# Patient Record
Sex: MALE | Race: WHITE | NOT HISPANIC OR LATINO | URBAN - METROPOLITAN AREA
[De-identification: names, ages, dates, MRNs, and addresses within clinical notes are randomized per-mention and may not be internally consistent; named-entity substitution may affect disease eponyms.]

---

## 2019-10-06 ENCOUNTER — EMERGENCY (EMERGENCY)
Facility: HOSPITAL | Age: 64
LOS: 0 days | Discharge: HOME | End: 2019-10-06
Attending: EMERGENCY MEDICINE | Admitting: EMERGENCY MEDICINE
Payer: MEDICARE

## 2019-10-06 VITALS
TEMPERATURE: 97 F | RESPIRATION RATE: 17 BRPM | SYSTOLIC BLOOD PRESSURE: 174 MMHG | OXYGEN SATURATION: 98 % | DIASTOLIC BLOOD PRESSURE: 79 MMHG | HEART RATE: 85 BPM

## 2019-10-06 DIAGNOSIS — K03.81 CRACKED TOOTH: ICD-10-CM

## 2019-10-06 DIAGNOSIS — K08.89 OTHER SPECIFIED DISORDERS OF TEETH AND SUPPORTING STRUCTURES: ICD-10-CM

## 2019-10-06 DIAGNOSIS — K02.9 DENTAL CARIES, UNSPECIFIED: ICD-10-CM

## 2019-10-06 PROCEDURE — 99283 EMERGENCY DEPT VISIT LOW MDM: CPT

## 2019-10-06 RX ORDER — AMOXICILLIN 250 MG/5ML
1 SUSPENSION, RECONSTITUTED, ORAL (ML) ORAL
Qty: 30 | Refills: 0
Start: 2019-10-06 | End: 2019-10-15

## 2019-10-06 RX ORDER — AMOXICILLIN 250 MG/5ML
500 SUSPENSION, RECONSTITUTED, ORAL (ML) ORAL ONCE
Refills: 0 | Status: COMPLETED | OUTPATIENT
Start: 2019-10-06 | End: 2019-10-06

## 2019-10-06 RX ORDER — IBUPROFEN 200 MG
1 TABLET ORAL
Qty: 20 | Refills: 0
Start: 2019-10-06 | End: 2019-10-10

## 2019-10-06 RX ORDER — IBUPROFEN 200 MG
600 TABLET ORAL ONCE
Refills: 0 | Status: COMPLETED | OUTPATIENT
Start: 2019-10-06 | End: 2019-10-06

## 2019-10-06 RX ADMIN — Medication 600 MILLIGRAM(S): at 16:27

## 2019-10-06 RX ADMIN — Medication 500 MILLIGRAM(S): at 16:27

## 2019-10-06 NOTE — ED PROVIDER NOTE - CLINICAL SUMMARY MEDICAL DECISION MAKING FREE TEXT BOX
Elderly male presented to ED for dental pain. dental block placed, will have pt f/up in dental clinic. Results reviewed and discussed with pt and printed for patient. Anticipatory guidance given including close outpatient followup. Strict return precautions given. Pt verbalizes understanding of and agrees with plan.

## 2019-10-06 NOTE — ED PROVIDER NOTE - PHYSICAL EXAMINATION
PHYSICAL EXAM:    GENERAL: Alert, appears stated age, well appearing, non-toxic  SKIN: Warm, pink and dry. MMM.   EYE: Normal lids/conjunctiva  ENT: Normal hearing, patent oropharynx. +overall poor dentition +tooth #10 cracked with TTP. no drooling, foul odor, trismus, discharge, swelling, warmth.   Pulm: Bilateral BS, normal resp effort, no wheezes, stridor, or retractions  CV: RRR, no M/R/G, 2+and = radial pulses  Neuro: AAOx3, normal gait.

## 2019-10-06 NOTE — CONSULT NOTE ADULT - SUBJECTIVE AND OBJECTIVE BOX
64 year old male presents to ED due to dental pain associated with upper left tooth #10.    As per ED notes:    "HPI Objective Statement: 65 y/o M with PMH MM no chemo x 3 mos, HTN, ankylosing spondylitis, CAD with MI x 2 stents (10 years ago), CHF, seizures on Lamictal presents with L throbbing, non-radiating, moderate upper tooth pain x 2 days. relates tooth has been cracked x 1 month. +benefit with cold beverages, worse with palpation. no fever/n/v. denies foul odor, trismus, discharge, swelling, warmth, decreased PO fluids, malaise, loose teeth.      ALLERGIES AND HOME MEDICATIONS:   Allergies:        Allergies:  	No Known Drug Allergies:   	Originally Entered as [Unknown] reaction to [ASPARTAME]: Miscellaneous, Unknown, Originally Entered as [Unknown] reaction to [ASPARTAME]"    ED has prescribed Amoxicillin and Motrin for symptom management.    EOE: WNL. No extra-oral swelling noted.  IOE: Poor dentition. Grossly decayed tooth #10 noted. No intra-oral swelling noted.    Patient requested injection for temporary pain relief. 1 carpule 0.5% marcaine 1:200,000 epinephrine via infiltration. Patient feels relief.    Recommended follow-up with outpatient dentist for definitive care. Patient states he has medicare and thus no dental insurance and does not have a primary dental provider. Patient states he understands that fees through the dental clinic would be out-of-pocket but may wish to follow-up in the [facility] dental clinic tomorrow.

## 2019-10-06 NOTE — ED PROVIDER NOTE - PATIENT PORTAL LINK FT
You can access the FollowMyHealth Patient Portal offered by [facility] by registering at the following website: [email or web address]. By joining [employer]’s FollowMyHealth portal, you will also be able to view your health information using other applications (apps) compatible with our system.

## 2019-10-06 NOTE — ED PROVIDER NOTE - OBJECTIVE STATEMENT
65 y/o M with PMH MM no chemo x 3 mos, HTN, anklyosing spondylitis, CAD with MI 65 y/o M with PMH MM no chemo x 3 mos, HTN, ankylosing spondylitis, CAD with MI x 2 stents (10 years ago), CHF, seizures on Lamictal presents with L throbbing, non-radiating, moderate upper tooth pain x 2 days. relates tooth has been cracked x 1 month. +benefit with cold beverages, worse with palpation. no fever/n/v. denies foul odor, trismus, discharge, swelling, warmth, decreased PO fluids, malaise, loose teeth.

## 2019-10-06 NOTE — ED PROVIDER NOTE - NS ED ROS FT
Review of Systems    Constitutional: (-) fever   Eyes/ENT: (-) vision changes  Respiratory: (-) cough, (-) shortness of breath  Gastrointestinal: (-) vomiting  Musculoskeletal: (-) neck pain  Integumentary: (-) rash, (-) edema  Neurological: (-) headache  Hematologic: (-) easy bruising

## 2019-10-06 NOTE — ED PROVIDER NOTE - PROGRESS NOTE DETAILS
paged dental. dental block performed by Dr. roblero, dental. pt to follow up with dental clinic in am. Reviewed all results and necessity for follow up. Counseled on red flags and to return for them.  Patient appears well on discharge.

## 2019-10-06 NOTE — ED PROVIDER NOTE - NSFOLLOWUPCLINICS_GEN_ALL_ED_FT
A Family Medicine Doctor  Family Medicine  .  [place]   Phone:   Fax:   Follow Up Time: 1-3 Days    [facility]  Dental  [address] [place] [id number]  Phone: [phone]  Fax:   Follow Up Time: 1-3 Days